# Patient Record
Sex: FEMALE | Race: WHITE | NOT HISPANIC OR LATINO | Employment: FULL TIME | URBAN - METROPOLITAN AREA
[De-identification: names, ages, dates, MRNs, and addresses within clinical notes are randomized per-mention and may not be internally consistent; named-entity substitution may affect disease eponyms.]

---

## 2020-05-19 ENCOUNTER — OFFICE VISIT (OUTPATIENT)
Dept: URGENT CARE | Facility: PHYSICIAN GROUP | Age: 40
End: 2020-05-19
Payer: COMMERCIAL

## 2020-05-19 VITALS
OXYGEN SATURATION: 100 % | WEIGHT: 146.2 LBS | TEMPERATURE: 97.9 F | RESPIRATION RATE: 16 BRPM | SYSTOLIC BLOOD PRESSURE: 140 MMHG | DIASTOLIC BLOOD PRESSURE: 90 MMHG | HEART RATE: 82 BPM

## 2020-05-19 DIAGNOSIS — G47.00 DIFFICULTY FALLING ASLEEP AT NIGHT UNTIL EARLY MORNING HOURS: ICD-10-CM

## 2020-05-19 PROCEDURE — 99203 OFFICE O/P NEW LOW 30 MIN: CPT | Performed by: PHYSICIAN ASSISTANT

## 2020-05-19 NOTE — PROGRESS NOTES
"Subjective:      Viktoriya Osman is a 39 y.o. female who presents with Unable to Sleep (sleeplessness going on for a few months)    Medications:    • This patient does not have an active medication from one of the medication groupers.    Allergies: Patient has no known allergies.    Problem List: Viktoriya Osman does not have a problem list on file.    Surgical History:  No past surgical history on file.    Past Social Hx: Viktoriya Osman  reports that she has never smoked. She has never used smokeless tobacco.     Past Family Hx:  Viktoriya Osman  Reviewed with patient/family member/EPIC.        Problem list, medications, and allergies reviewed by myself today in Epic.          Patient presents with:  Unable to Sleep: sleeplessness going on for a few months. Pt thinks she may have restless leg syndrome, though her \"legs are not restless, the rest of me is.\"  States she has to wiggle her legs to release the anxious feelings in her body.  Pt states she has recently changed her diet to low calorie, long hours of fasting.  Pt states the only thing that was helping was drinking alcohol nightly so she stopped drinking 12 weeks ago and has had issues ever since.  Pt states medications like benadryl make her symptoms worse. PT has been working from home which has thrown her schedule off as well which is making her insomnia worse.  Pt denies feelings of anxiety due to COVID.  Pt does not have PCP.   PT has never taken Rx medications for sleep.        Other   This is a new problem. The current episode started more than 1 month ago. The problem occurs daily. The problem has been gradually worsening. Associated symptoms include fatigue. Pertinent negatives include no anorexia, arthralgias, change in bowel habit, congestion, coughing, fever, headaches, joint swelling, myalgias or rash. Exacerbated by: lack of consistent work schedule. Treatments tried: alcohol, diet change, benadryl. The treatment provided no relief.       Review of Systems "   Constitutional: Positive for fatigue. Negative for fever.   HENT: Negative for congestion.    Respiratory: Negative for cough.    Gastrointestinal: Negative for anorexia and change in bowel habit.   Musculoskeletal: Negative for arthralgias, joint swelling and myalgias.   Skin: Negative for rash.   Neurological: Negative for dizziness, tingling and headaches.   Psychiatric/Behavioral: Negative for depression and suicidal ideas. The patient is nervous/anxious and has insomnia.    All other systems reviewed and are negative.         Objective:     /90 (BP Location: Right arm, Patient Position: Sitting, BP Cuff Size: Adult)   Pulse 82   Temp 36.6 °C (97.9 °F) (Temporal)   Resp 16   Wt 66.3 kg (146 lb 3.2 oz)   SpO2 100%      Physical Exam  Vitals signs and nursing note reviewed.   Constitutional:       General: She is not in acute distress.     Appearance: Normal appearance. She is well-developed.   HENT:      Head: Normocephalic and atraumatic.      Right Ear: Tympanic membrane normal.      Left Ear: Tympanic membrane normal.      Nose: Nose normal.      Mouth/Throat:      Lips: Pink.      Mouth: Mucous membranes are moist.      Pharynx: Oropharynx is clear. Uvula midline.   Eyes:      Extraocular Movements: Extraocular movements intact.      Conjunctiva/sclera: Conjunctivae normal.      Pupils: Pupils are equal, round, and reactive to light.   Neck:      Musculoskeletal: Normal range of motion and neck supple.   Cardiovascular:      Rate and Rhythm: Normal rate and regular rhythm.      Pulses: Normal pulses.      Heart sounds: Normal heart sounds.   Pulmonary:      Effort: Pulmonary effort is normal.      Breath sounds: Normal breath sounds.   Abdominal:      General: Bowel sounds are normal.      Palpations: Abdomen is soft.   Musculoskeletal: Normal range of motion.   Skin:     General: Skin is warm and dry.      Capillary Refill: Capillary refill takes less than 2 seconds.   Neurological:       "General: No focal deficit present.      Mental Status: She is alert and oriented to person, place, and time.      Gait: Gait normal.   Psychiatric:         Attention and Perception: Attention normal.         Mood and Affect: Mood is anxious.         Speech: Speech normal.         Behavior: Behavior is agitated.         Thought Content: Thought content normal.         Cognition and Memory: Cognition normal.         Judgment: Judgment normal.                 Assessment/Plan:     1. Difficulty falling asleep at night until early morning hours           Offered pt hydroxyzine, declined.      PT was informed that we do not start patients on long term treatment for restless leg, insomnia or anxiety in Urgent care, that is a function of Primary Care.  Pt does not have PCP, so I attempted to get her set up for an upcoming appointment ( I spoke with Blade Batres who was willing to put her into the soonest available appointment).  Pt did not want to do this, she stated she would \" have to do some research first before being seen by just anyone\".  She seemed quite irritated that I would not start her on medications. I reiterated that I could get her seen quickly by PCP who could treat her long term and again she declined.  PT then commented about the difficulty of being seen compared to [place].  I offered one more time to get her on the schedule at [facility] [place] [facility], declined.  Pt encouraged to continue her abstinence from alcohol to get to sleep. I suggested visualization, calming music at bedtime to help her fall asleep.     Discussed red flags and reasons to return to  or ED.  Pt and/or family verbalized understanding of diagnosis and follow up instructions and was offered informational handout on diagnosis.  PT discharged.     "

## 2020-05-20 ASSESSMENT — ENCOUNTER SYMPTOMS
TINGLING: 0
JOINT SWELLING: 0
DIZZINESS: 0
FEVER: 0
COUGH: 0
ARTHRALGIAS: 0
DEPRESSION: 0
CHANGE IN BOWEL HABIT: 0
FATIGUE: 1
NERVOUS/ANXIOUS: 1
ANOREXIA: 0
MYALGIAS: 0
HEADACHES: 0
INSOMNIA: 1